# Patient Record
Sex: FEMALE | Race: WHITE | Employment: FULL TIME | URBAN - METROPOLITAN AREA
[De-identification: names, ages, dates, MRNs, and addresses within clinical notes are randomized per-mention and may not be internally consistent; named-entity substitution may affect disease eponyms.]

---

## 2017-02-14 ENCOUNTER — HOSPITAL ENCOUNTER (OUTPATIENT)
Age: 42
Discharge: HOME | End: 2017-02-14
Attending: FAMILY MEDICINE
Payer: COMMERCIAL

## 2017-02-14 VITALS
WEIGHT: 158 LBS | HEIGHT: 63 IN | RESPIRATION RATE: 16 BRPM | OXYGEN SATURATION: 98 % | BODY MASS INDEX: 28 KG/M2 | HEART RATE: 88 BPM | TEMPERATURE: 98 F | DIASTOLIC BLOOD PRESSURE: 91 MMHG | SYSTOLIC BLOOD PRESSURE: 130 MMHG

## 2017-02-14 DIAGNOSIS — L03.119 CELLULITIS OF WRIST: Primary | ICD-10-CM
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
## 2017-02-14 PROCEDURE — 90471 IMMUNIZATION ADMIN: CPT

## 2017-02-14 PROCEDURE — 99204 OFFICE O/P NEW MOD 45 MIN: CPT

## 2017-02-14 PROCEDURE — 96372 THER/PROPH/DIAG INJ SC/IM: CPT

## 2017-02-14 RX ORDER — AMOXICILLIN AND CLAVULANATE POTASSIUM 875; 125 MG/1; MG/1
1 TABLET, FILM COATED ORAL 2 TIMES DAILY
Qty: 20 TABLET | Refills: 0 | Status: SHIPPED | OUTPATIENT
Start: 2017-02-14 | End: 2017-02-24

## 2017-02-14 NOTE — ED PROVIDER NOTES
Patient Seen in: [address] [facility] [place]    History   Patient presents with:  Animal Bite    Stated Complaint: dog bite    HPI  64-year-old female here with a dog bite to the left hand/wrist region. This happened 5 days ago.   Her dog is old and Current:/91 mmHg  Pulse 88  Temp(Src) 97.8 °F (36.6 °C) (Temporal)  Resp 16  Ht 160 cm (5' 3\")  Wt 71.668 kg  BMI 28.00 kg/m2  SpO2 98%  LMP 01/24/2017        Physical Exam  GENERAL: well developed, well nourished,in no apparent distress  SKIN immediate care and treat with Augmentin. She and also has full range of motion of the left wrist and of all the digits of the left hand. Full range of motion of the left forearm as well. Patient verbalized understanding and agreed with the plan.

## 2017-02-14 NOTE — ED INITIAL ASSESSMENT (HPI)
On Friday patient states her dog bit her. Patient states her dog is utd on all shots. Puncture wound to left hand is now red, swollen, with some drainage. No fevers.

## (undated) NOTE — ED AVS SNAPSHOT
[facility] in [address] [id number]    Phone:  [phone]    Fax:  [phone]   MRN: [id number]    Department:  [facility] in [facility]   Date of Visit:  2/14/2017 changes or worse please go to the ER immediately  Follow up with PMD in 2 days for recheck on symptoms   Your tetanus was updated here today.      Discharge References/Attachments     SKIN INFECTION, CELLULITIS (ENGLISH)    DOG BITE (ENGLISH)      Disclosur care or specialist physician will see patients referred from the [facility]. Follow-up care is at the discretion of that Physician.     IF THERE IS ANY CHANGE OR WORSENING OF YOUR CONDITION, CALL YOUR PRIMARY CARE PHYSICIAN AT ONCE OR GO TO THE harming yourself, contact [facility] at [phone]. - If you don’t have insurance, Olga Bermudez has partnered with Patient [address] to help you get signed up for insurance coverage.   [facility] [place]